# Patient Record
Sex: MALE | URBAN - METROPOLITAN AREA
[De-identification: names, ages, dates, MRNs, and addresses within clinical notes are randomized per-mention and may not be internally consistent; named-entity substitution may affect disease eponyms.]

---

## 2019-06-05 ENCOUNTER — TELEPHONE (OUTPATIENT)
Dept: GASTROENTEROLOGY | Facility: CLINIC | Age: 74
End: 2019-06-05

## 2019-06-05 NOTE — TELEPHONE ENCOUNTER
----- Message from Beatriz Phelps sent at 6/4/2019  5:46 PM CDT -----  Contact: self  Pt is due for rx of demerol.  Please call when it is ready.

## 2019-12-20 DIAGNOSIS — R05.9 COUGH: Primary | ICD-10-CM
